# Patient Record
Sex: FEMALE | Race: OTHER | Employment: UNEMPLOYED | URBAN - METROPOLITAN AREA
[De-identification: names, ages, dates, MRNs, and addresses within clinical notes are randomized per-mention and may not be internally consistent; named-entity substitution may affect disease eponyms.]

---

## 2017-12-14 ENCOUNTER — OFFICE VISIT (OUTPATIENT)
Dept: FAMILY MEDICINE CLINIC | Age: 52
End: 2017-12-14

## 2017-12-14 VITALS
TEMPERATURE: 98.3 F | RESPIRATION RATE: 16 BRPM | BODY MASS INDEX: 28.66 KG/M2 | SYSTOLIC BLOOD PRESSURE: 155 MMHG | WEIGHT: 151.8 LBS | HEART RATE: 98 BPM | OXYGEN SATURATION: 98 % | DIASTOLIC BLOOD PRESSURE: 85 MMHG | HEIGHT: 61 IN

## 2017-12-14 DIAGNOSIS — J40 BRONCHITIS: Primary | ICD-10-CM

## 2017-12-14 RX ORDER — AZITHROMYCIN 250 MG/1
TABLET, FILM COATED ORAL
Qty: 6 TAB | Refills: 0 | Status: SHIPPED | OUTPATIENT
Start: 2017-12-14 | End: 2018-06-14 | Stop reason: ALTCHOICE

## 2017-12-14 NOTE — PROGRESS NOTES
Jaswinder Ferguson is a 47 y.o.  female and presents with a few days of productive cough. She [place]. No smoking or chronic URI or LRI. Chief Complaint   Patient presents with    Cough     Subjective: Additional Concerns: none    Patient Active Problem List    Diagnosis Date Noted    Family history of fatty liver 02/10/2016     Current Outpatient Prescriptions   Medication Sig Dispense Refill    azithromycin (ZITHROMAX) 250 mg tablet 2 today, then 1 daily till gone. 6 Tab 0    ibuprofen (MOTRIN) 800 mg tablet Take 1 Tab by mouth every eight (8) hours as needed for Pain. 30 Tab 0    multivitamin (ONE A DAY) tablet Take 1 Tab by mouth daily.  calcium 500 mg tab Take 1 Tab by mouth daily. Allergies   Allergen Reactions    Clindamycin Hives    Nickel Rash    Silver Rash     Past Medical History:   Diagnosis Date    Infertility, female      History reviewed. No pertinent surgical history.   Family History   Problem Relation Age of Onset   Harjit Ha Cancer Mother      liver    Liver Disease Sister     Liver Disease Sister      Social History   Substance Use Topics    Smoking status: Never Smoker    Smokeless tobacco: Never Used    Alcohol use Yes      Comment: socially     ROS     General: negative for - chills, fatigue, fever, weight change  ENT: negative for - headaches, hearing change, nasal congestion, oral lesions, sneezing or sore throat  Resp: positive for - cough, no shortness of breath or wheezing  CV: negative for - chest pain, edema or palpitations    Objective:  Vitals:    12/14/17 1611   BP: 155/85   Pulse: 98   Resp: 16   Temp: 98.3 °F (36.8 °C)   TempSrc: Oral   SpO2: 98%   Weight: 151 lb 12.8 oz (68.9 kg)   Height: 5' 1\" (1.549 m)   PainSc:   9     PE    alert, well appearing, and in no distress, oriented to person, place, and time and overweight  Mental status - alert, oriented to person, place, and time, normal mood, behavior, speech, dress, motor activity, and thought processes  Chest - clear to auscultation, no wheezes, rales or rhonchi, symmetric air entry  Heart - normal rate, regular rhythm, normal S1, S2, no murmurs, rubs, clicks or gallops    LABS   No visits with results within 6 Month(s) from this visit. Latest known visit with results is:    Office Visit on 07/12/2016   Component Date Value Ref Range Status    VALID INTERNAL CONTROL POC 07/12/2016 Yes   Final    HCG urine, Ql. (POC) 07/12/2016 Negative  Negative Final       TESTS  Results for orders placed or performed in visit on 07/12/16   AMB POC URINE PREGNANCY TEST, VISUAL COLOR COMPARISON   Result Value Ref Range    VALID INTERNAL CONTROL POC Yes     HCG urine, Ql. (POC) Negative Negative     Assessment/Plan:       Bronchitis - OTC cough and mucinex for thick mucus.   - azithromycin (ZITHROMAX) 250 mg tablet; 2 today, then 1 daily till gone. Dispense: 6 Tab; Refill: 0    Lab review: no lab studies available for review at time of visit. I have discussed the diagnosis with the patient and the intended plan as seen in the above orders. The patient has received an after-visit summary and questions were answered concerning future plans. I have discussed medication side effects and warnings with the patient as well. I have reviewed the plan of care with the patient, accepted their input and they are in agreement with the treatment goals. F/U as needed.      Benito Paz MD

## 2017-12-14 NOTE — PROGRESS NOTES
Rebeca Montano is a 47 y.o. female presents to office for cough for almost a week        1.  Have you been to the ER, urgent care clinic or hospitalized since your last visit? no          Health Maintenance items with a due date reviewed with patient:  Health Maintenance Due   Topic Date Due    Hepatitis C Screening  1965    FOBT Q 1 YEAR AGE 50-75  10/06/2014    Influenza Age 9 to Adult  08/01/2017    PAP AKA CERVICAL CYTOLOGY  09/03/2017

## 2018-06-14 ENCOUNTER — HOSPITAL ENCOUNTER (OUTPATIENT)
Dept: LAB | Age: 53
Discharge: HOME | End: 2018-06-14
Payer: COMMERCIAL

## 2018-06-14 ENCOUNTER — OFFICE VISIT (OUTPATIENT)
Dept: FAMILY MEDICINE CLINIC | Age: 53
End: 2018-06-14

## 2018-06-14 VITALS
SYSTOLIC BLOOD PRESSURE: 124 MMHG | OXYGEN SATURATION: 97 % | BODY MASS INDEX: 24.92 KG/M2 | DIASTOLIC BLOOD PRESSURE: 82 MMHG | RESPIRATION RATE: 17 BRPM | TEMPERATURE: 97.2 F | WEIGHT: 132 LBS | HEIGHT: 61 IN | HEART RATE: 96 BPM

## 2018-06-14 DIAGNOSIS — K85.80 OTHER ACUTE PANCREATITIS, UNSPECIFIED COMPLICATION STATUS: Primary | ICD-10-CM

## 2018-06-14 DIAGNOSIS — K85.80 OTHER ACUTE PANCREATITIS, UNSPECIFIED COMPLICATION STATUS: ICD-10-CM

## 2018-06-14 DIAGNOSIS — G43.109 MIGRAINE WITH AURA AND WITHOUT STATUS MIGRAINOSUS, NOT INTRACTABLE: ICD-10-CM

## 2018-06-14 LAB
ALBUMIN SERPL-MCNC: 3.8 G/DL (ref 3.4–5)
ALBUMIN/GLOB SERPL: 1.1 {RATIO} (ref 0.8–1.7)
ALP SERPL-CCNC: 219 U/L (ref 45–117)
ALT SERPL-CCNC: 224 U/L (ref 13–56)
ANION GAP SERPL CALC-SCNC: 10 MMOL/L (ref 3–18)
AST SERPL-CCNC: 32 U/L (ref 15–37)
BILIRUB SERPL-MCNC: 0.5 MG/DL (ref 0.2–1)
BUN SERPL-MCNC: 5 MG/DL (ref 7–18)
BUN/CREAT SERPL: 9 (ref 12–20)
CALCIUM SERPL-MCNC: 9.5 MG/DL (ref 8.5–10.1)
CHLORIDE SERPL-SCNC: 103 MMOL/L (ref 100–108)
CHOLEST SERPL-MCNC: 175 MG/DL
CO2 SERPL-SCNC: 28 MMOL/L (ref 21–32)
CREAT SERPL-MCNC: 0.55 MG/DL (ref 0.6–1.3)
GLOBULIN SER CALC-MCNC: 3.4 G/DL (ref 2–4)
GLUCOSE SERPL-MCNC: 69 MG/DL (ref 74–99)
HDLC SERPL-MCNC: 60 MG/DL (ref 40–60)
HDLC SERPL: 2.9 {RATIO} (ref 0–5)
LDLC SERPL CALC-MCNC: 95.2 MG/DL (ref 0–100)
LIPASE SERPL-CCNC: 283 U/L (ref 73–393)
LIPID PROFILE,FLP: NORMAL
POTASSIUM SERPL-SCNC: 3.7 MMOL/L (ref 3.5–5.5)
PROT SERPL-MCNC: 7.2 G/DL (ref 6.4–8.2)
SODIUM SERPL-SCNC: 141 MMOL/L (ref 136–145)
TRIGL SERPL-MCNC: 99 MG/DL (ref ?–150)
VLDLC SERPL CALC-MCNC: 19.8 MG/DL

## 2018-06-14 PROCEDURE — 36415 COLL VENOUS BLD VENIPUNCTURE: CPT | Performed by: FAMILY MEDICINE

## 2018-06-14 PROCEDURE — 80053 COMPREHEN METABOLIC PANEL: CPT | Performed by: FAMILY MEDICINE

## 2018-06-14 PROCEDURE — 80061 LIPID PANEL: CPT | Performed by: FAMILY MEDICINE

## 2018-06-14 PROCEDURE — 83690 ASSAY OF LIPASE: CPT | Performed by: FAMILY MEDICINE

## 2018-06-14 RX ORDER — BUTALBITAL, ACETAMINOPHEN AND CAFFEINE 50; 325; 40 MG/1; MG/1; MG/1
1 TABLET ORAL
Qty: 45 TAB | Refills: 1 | Status: SHIPPED | OUTPATIENT
Start: 2018-06-14

## 2018-06-14 NOTE — PATIENT INSTRUCTIONS
Migraine Headache: Care Instructions  Your Care Instructions  Migraines are painful, throbbing headaches that often start on one side of the head. They may cause nausea and vomiting and make you sensitive to light, sound, or smell. Without treatment, migraines can last from 4 hours to a few days. Medicines can help prevent migraines or stop them after they have started. Your doctor can help you find which ones work best for you. Follow-up care is a key part of your treatment and safety. Be sure to make and go to all appointments, and call your doctor if you are having problems. It's also a good idea to know your test results and keep a list of the medicines you take. How can you care for yourself at home? · Do not drive if you have taken a prescription pain medicine. · Rest in a quiet, dark room until your headache is gone. Close your eyes, and try to relax or go to sleep. Don't watch TV or read. · Put a cold, moist cloth or cold pack on the painful area for 10 to 20 minutes at a time. Put a thin cloth between the cold pack and your skin. · Use a warm, moist towel or a heating pad set on low to relax tight shoulder and neck muscles. · Have someone gently massage your neck and shoulders. · Take your medicines exactly as prescribed. Call your doctor if you think you are having a problem with your medicine. You will get more details on the specific medicines your doctor prescribes. · Be careful not to take pain medicine more often than the instructions allow. You could get worse or more frequent headaches when the medicine wears off. To prevent migraines  · Keep a headache diary so you can figure out what triggers your headaches. Avoiding triggers may help you prevent headaches. Record when each headache began, how long it lasted, and what the pain was like.  (Was it throbbing, aching, stabbing, or dull?) Write down any other symptoms you had with the headache, such as nausea, flashing lights or dark spots, or sensitivity to bright light or loud noise. Note if the headache occurred near your period. List anything that might have triggered the headache. Triggers may include certain foods (chocolate, cheese, wine) or odors, smoke, bright light, stress, or lack of sleep. · If your doctor has prescribed medicine for your migraines, take it as directed. You may have medicine that you take only when you get a migraine and medicine that you take all the time to help prevent migraines. ¨ If your doctor has prescribed medicine for when you get a headache, take it at the first sign of a migraine, unless your doctor has given you other instructions. ¨ If your doctor has prescribed medicine to prevent migraines, take it exactly as prescribed. Call your doctor if you think you are having a problem with your medicine. · Find healthy ways to deal with stress. Migraines are most common during or right after stressful times. Take time to relax before and after you do something that has caused a migraine in the past.  · Try to keep your muscles relaxed by keeping good posture. Check your jaw, face, neck, and shoulder muscles for tension. Try to relax them. When you sit at a desk, change positions often. And make sure to stretch for 30 seconds each hour. · Get plenty of sleep and exercise. · Eat meals on a regular schedule. Avoid foods and drinks that often trigger migraines. These include chocolate, alcohol (especially red wine and port), aspartame, monosodium glutamate (MSG), and some additives found in foods (such as hot dogs, roach, cold cuts, aged cheeses, and pickled foods). · Limit caffeine. Don't drink too much coffee, tea, or soda. But don't quit caffeine suddenly. That can also give you migraines. · Do not smoke or allow others to smoke around you. If you need help quitting, talk to your doctor about stop-smoking programs and medicines. These can increase your chances of quitting for good.   · If you are taking birth control pills or hormone therapy, talk to your doctor about whether they are triggering your migraines. When should you call for help? Call 911 anytime you think you may need emergency care. For example, call if:  ? · You have signs of a stroke. These may include:  ¨ Sudden numbness, paralysis, or weakness in your face, arm, or leg, especially on only one side of your body. ¨ Sudden vision changes. ¨ Sudden trouble speaking. ¨ Sudden confusion or trouble understanding simple statements. ¨ Sudden problems with walking or balance. ¨ A sudden, severe headache that is different from past headaches. ?Call your doctor now or seek immediate medical care if:  ? · You have new or worse nausea and vomiting. ? · You have a new or higher fever. ? · Your headache gets much worse. ? Watch closely for changes in your health, and be sure to contact your doctor if:  ? · You are not getting better after 2 days (48 hours). Where can you learn more? Go to http://shaneka-rk.info/. Enter K110 in the search box to learn more about \"Migraine Headache: Care Instructions. \"  Current as of: October 14, 2016  Content Version: 11.4  © 0321-7527 [employer]. Care instructions adapted under license by [employer] (which disclaims liability or warranty for this information). If you have questions about a medical condition or this instruction, always ask your healthcare professional. [place] [address] any warranty or liability for your use of this information. Pancreatitis: Care Instructions  Your Care Instructions    The pancreas is an organ behind the stomach. It makes hormones and enzymes to help your body digest food. But if these enzymes attack the pancreas, it can get inflamed. This is called pancreatitis. Most cases are caused by gallstones or by heavy alcohol use. If you take care of yourself at home, it will help you get better.  It will also help you avoid more problems with your pancreas. Follow-up care is a key part of your treatment and safety. Be sure to make and go to all appointments, and call your doctor if you are having problems. It's also a good idea to know your test results and keep a list of the medicines you take. How can you care for yourself at home? · Drink clear liquids and eat bland foods until you feel better. [place] foods include rice, dry toast, and crackers. They also include bananas and applesauce. · Eat a low-fat diet until your doctor says your pancreas is healed. · Do not drink alcohol. Tell your doctor if you need help to quit. Counseling, support groups, and sometimes medicines can help you stay sober. · Be safe with medicines. Read and follow all instructions on the label. ¨ If the doctor gave you a prescription medicine for pain, take it as prescribed. ¨ If you are not taking a prescription pain medicine, ask your doctor if you can take an over-the-counter medicine. · If your doctor prescribed antibiotics, take them as directed. Do not stop taking them just because you feel better. You need to take the full course of antibiotics. · Get extra rest until you feel better. To prevent future problems with your pancreas  · Do not drink alcohol. · Tell your doctors and pharmacist that you've had pancreatitis. They can help you avoid medicines that may cause this problem again. When should you call for help? Call 911 anytime you think you may need emergency care. For example, call if:  ? · You vomit blood or what looks like coffee grounds. ? · Your stools are maroon or very bloody. ?Call your doctor now or seek immediate medical care if:  ? · You have new or worse belly pain. ? · Your stools are black and look like tar, or they have streaks of blood. ? · You are vomiting. ? Watch closely for changes in your health, and be sure to contact your doctor if:  ? · You do not get better as expected.    Where can you learn more?  Go to http://shaneka-rk.info/. Enter N264 in the search box to learn more about \"Pancreatitis: Care Instructions. \"  Current as of: May 12, 2017  Content Version: 11.4  © 9125-3732 [employer], [employer]. Care instructions adapted under license by [employer] (which disclaims liability or warranty for this information). If you have questions about a medical condition or this instruction, always ask your healthcare professional. [address] any warranty or liability for your use of this information.

## 2018-06-14 NOTE — PROGRESS NOTES
Nabeel Gabe is a 47 y.o. female presents to office for hospital follow up    Medication list has been reviewed with Nabeel Bernal and updated as of today's date     Health Maintenance items with a due date reviewed with patient:  Health Maintenance Due   Topic Date Due    Hepatitis C Screening  1965    BREAST CANCER SCRN MAMMOGRAM  10/06/2014    FOBT Q 1 YEAR AGE 50-75  10/06/2014    PAP AKA CERVICAL CYTOLOGY  09/03/2017

## 2018-06-16 NOTE — PROGRESS NOTES
Pls report to patient her lipase marker for pancreatitis back to normal and rest of labs normal to near normal. Patient still needs to F/U with referral regardless to make sure   We do not have anything else we need to do or monitor.

## 2018-06-16 NOTE — PROGRESS NOTES
Jairon Abrams is a 47 y.o. [address] female and presents with F/U for hosp adm for acute idiopathic pancreatitis. Patient   Denies chronic abuse of alcohol or illegal drug use. No current meds that may cause it. Patient received supportive treatment in hospital and discharged  To F/U with PCP. No specialist referral from hospital. Patient appears recovered now with near normal lipase and asymptomatic. Chief Complaint   Patient presents with   [facility] Follow Up     Subjective: Additional Concerns: none    Patient Active Problem List    Diagnosis Date Noted    Family history of fatty liver 02/10/2016     Current Outpatient Prescriptions   Medication Sig Dispense Refill    butalbital-acetaminophen-caffeine (FIORICET, ESGIC) -40 mg per tablet Take 1 Tab by mouth every six (6) hours as needed for Pain. Indications: Migraine 45 Tab 1    ibuprofen (MOTRIN) 800 mg tablet Take 1 Tab by mouth every eight (8) hours as needed for Pain. 30 Tab 0    multivitamin (ONE A DAY) tablet Take 1 Tab by mouth daily.  calcium 500 mg tab Take 1 Tab by mouth daily.        Allergies   Allergen Reactions    Clindamycin Hives    Nickel Rash    Silver Rash     Past Medical History:   Diagnosis Date    Infertility, female      Past Surgical History:   Procedure Laterality Date    HX CHOLECYSTECTOMY       Family History   Problem Relation Age of Onset    Cancer Mother      liver    Liver Disease Sister     Liver Disease Sister      Social History   Substance Use Topics    Smoking status: Never Smoker    Smokeless tobacco: Never Used    Alcohol use Yes      Comment: socially     ROS     General: negative for - chills, fatigue, fever, weight change  Resp: negative for - cough, shortness of breath or wheezing  CV: negative for - chest pain, edema or palpitations  GI: negative for - abdominal pain, change in bowel habits, constipation, diarrhea or nausea/vomiting  : negative for - dysuria, hematuria, incontinence, pelvic pain or vulvar/vaginal symptoms  Neuro: negative for - confusion, positive headaches, no seizures or weakness    Objective:  Vitals:    06/14/18 1026   BP: 124/82   Pulse: 96   Resp: 17   Temp: 97.2 °F (36.2 °C)   TempSrc: Oral   SpO2: 97%   Weight: 132 lb (59.9 kg)   Height: 5' 1\" (1.549 m)   PainSc:   0 - No pain     PE    Alert, well appearing, and in no distress, oriented to person, place, and time and normal appearing weight  General appearance - alert, well appearing, and in no distress, oriented to person, place, and time and normal appearing weight  Mental status - alert, oriented to person, place, and time, normal mood, behavior, speech, dress, motor activity, and thought processes  Chest - clear to auscultation, no wheezes, rales or rhonchi, symmetric air entry  Heart - normal rate, regular rhythm, normal S1, S2, no murmurs, rubs, clicks or gallops  Extremities - peripheral pulses normal, no pedal edema, no clubbing or cyanosis    [facility]   Component Date Value Ref Range Status    LIPID PROFILE 06/14/2018        Final    Cholesterol, total 06/14/2018 175  <200 MG/DL Final    Triglyceride 06/14/2018 99  <150 MG/DL Final    Comment: The drugs N-acetylcysteine (NAC) and  Metamiszole have been found to cause falsely  low results in this chemical assay. Please  be sure to submit blood samples obtained  BEFORE administration of either of these  drugs to assure correct results.       HDL Cholesterol 06/14/2018 60  40 - 60 MG/DL Final    LDL, calculated 06/14/2018 95.2  0 - 100 MG/DL Final    VLDL, calculated 06/14/2018 19.8  MG/DL Final    CHOL/HDL Ratio 06/14/2018 2.9  0 - 5.0   Final    Sodium 06/14/2018 141  136 - 145 mmol/L Final    Potassium 06/14/2018 3.7  3.5 - 5.5 mmol/L Final    Chloride 06/14/2018 103  100 - 108 mmol/L Final    CO2 06/14/2018 28  21 - 32 mmol/L Final    Anion gap 06/14/2018 10  3.0 - 18 mmol/L Final    Glucose 06/14/2018 69* 74 - 99 mg/dL Final    BUN 06/14/2018 5* 7.0 - 18 MG/DL Final    Creatinine 06/14/2018 0.55* 0.6 - 1.3 MG/DL Final    BUN/Creatinine ratio 06/14/2018 9* 12 - 20   Final    GFR est AA 06/14/2018 >60  >60 ml/min/1.73m2 Final    GFR est non-AA 06/14/2018 >60  >60 ml/min/1.73m2 Final    Comment: (NOTE)  Estimated GFR is calculated using the Modification of Diet in Renal   Disease (MDRD) Study equation, reported for both  Americans   (GFRAA) and non- Americans (GFRNA), and normalized to 1.73m2   body surface area. The physician must decide which value applies to   the patient. The MDRD study equation should only be used in   individuals age 25 or older. It has not been validated for the   following: pregnant women, patients with serious comorbid conditions,   or on certain medications, or persons with extremes of body size,   muscle mass, or nutritional status.  Calcium 06/14/2018 9.5  8.5 - 10.1 MG/DL Final    Bilirubin, total 06/14/2018 0.5  0.2 - 1.0 MG/DL Final    ALT (SGPT) 06/14/2018 224* 13 - 56 U/L Final    AST (SGOT) 06/14/2018 32  15 - 37 U/L Final    Alk. phosphatase 06/14/2018 219* 45 - 117 U/L Final    Protein, total 06/14/2018 7.2  6.4 - 8.2 g/dL Final    Albumin 06/14/2018 3.8  3.4 - 5.0 g/dL Final    Globulin 06/14/2018 3.4  2.0 - 4.0 g/dL Final    A-G Ratio 06/14/2018 1.1  0.8 - 1.7   Final    Lipase 06/14/2018 283  73 - 393 U/L Final       TESTS  Results for orders placed or performed in visit on 07/12/16   AMB POC URINE PREGNANCY TEST, VISUAL COLOR COMPARISON   Result Value Ref Range    VALID INTERNAL CONTROL POC Yes     HCG urine, Ql. (POC) Negative Negative     Assessment/Plan:      1. Other acute pancreatitis, unspecified complication status  - REFERRAL TO GASTROENTEROLOGY  - LIPID PANEL; Future  - METABOLIC PANEL, COMPREHENSIVE; Future  - LIPASE; Future    2.  Migraine with aura and without status migrainosus, not intractable  - butalbital-acetaminophen-caffeine (FIORICET, ESGIC) -40 mg per tablet; Take 1 Tab by mouth every six (6) hours as needed for Pain. Indications: Migraine  Dispense: 45 Tab; Refill: 1    Lab review: orders written for new lab studies as appropriate; see orders. We will call patient for results and further plan. I have discussed the diagnosis with the patient and the intended plan as seen in the above orders. The patient has received an after-visit summary and questions were answered concerning future plans. I have discussed medication side effects and warnings with the patient as well. I have reviewed the plan of care with the patient, accepted their input and they are in agreement with the treatment goals. F/U as needed.      Neil Huynh MD

## 2018-06-18 ENCOUNTER — TELEPHONE (OUTPATIENT)
Dept: FAMILY MEDICINE CLINIC | Age: 53
End: 2018-06-18

## 2018-06-18 NOTE — TELEPHONE ENCOUNTER
Pt called to f/u on lab results,   Final results and interpretation in pt's chart. Read result note to pt, she was understanding and had not f/u questions.      Closing encounter

## 2018-07-13 ENCOUNTER — DOCUMENTATION ONLY (OUTPATIENT)
Dept: FAMILY MEDICINE CLINIC | Age: 53
End: 2018-07-13

## 2018-07-13 NOTE — PROGRESS NOTES
Pt called this morning extremely upset and yelling at me about a bill she received from   Gastroenterology [address] [place] [address]  [address], [address] [facility]  Main Phone: [phone]  Fax: [phone]    Pt repeatedly yelled at me that we scheduled an appointment for her without her knowledge and she received a bill for $50.00 due to no-showing the appointment. I tried to communicate multiple times to the pt that we did not schedule and appointment for her, that we sent her referral out but that we do not schedule appointments for pt's at specialty offices. She continually spoke over me every time I would try to speak and would repeat that we scheduled an appointment for her and needed to call and fix the bill. I advised the pt to call [facility] multiple time to discuss the charges with them as we were not able to dispute those charges for her. The pt continued to repeat herself and yell over me throughout the call and yelled at me that I was no listening to her. I stated to the pt that I was listening to her but that I felt we had come to stalemate in the conversation as there was nothing else I could help her with at this time. I advised the pt again to reach out to the Gastroenterology office and that if she was unable to resolve the issue she could call and speak with a manager next week. The pt stated again that we scheduled the appointment and we needed to fix the issue and hung up with me. I called [facility] and spoke with their office, the pt was scheduled for an appointment on 6/12 by a nurse in the hospital prior to her discharge, the appointment was set for 7/10/18. Gastroenterology stated they would waive the fee for the pt since it was not scheduled by the pt. Called pt to advise, SUKHWINDER with information for pt to call [facility] in regards to them waiving $50.00 fee.

## 2019-01-16 ENCOUNTER — OFFICE VISIT (OUTPATIENT)
Dept: FAMILY MEDICINE CLINIC | Age: 54
End: 2019-01-16

## 2019-01-16 ENCOUNTER — HOSPITAL ENCOUNTER (OUTPATIENT)
Dept: LAB | Age: 54
Discharge: HOME | End: 2019-01-16
Payer: COMMERCIAL

## 2019-01-16 VITALS
TEMPERATURE: 97.8 F | OXYGEN SATURATION: 99 % | WEIGHT: 130.8 LBS | SYSTOLIC BLOOD PRESSURE: 107 MMHG | BODY MASS INDEX: 24.7 KG/M2 | DIASTOLIC BLOOD PRESSURE: 71 MMHG | HEART RATE: 82 BPM | RESPIRATION RATE: 16 BRPM | HEIGHT: 61 IN

## 2019-01-16 DIAGNOSIS — R10.9 ABDOMINAL PAIN, UNSPECIFIED ABDOMINAL LOCATION: ICD-10-CM

## 2019-01-16 DIAGNOSIS — Z87.19 H/O ACUTE PANCREATITIS: ICD-10-CM

## 2019-01-16 DIAGNOSIS — R11.0 NAUSEA: ICD-10-CM

## 2019-01-16 DIAGNOSIS — J06.9 URTI (ACUTE UPPER RESPIRATORY INFECTION): ICD-10-CM

## 2019-01-16 DIAGNOSIS — R11.0 NAUSEA: Primary | ICD-10-CM

## 2019-01-16 LAB
ALBUMIN SERPL-MCNC: 4 G/DL (ref 3.4–5)
ALBUMIN/GLOB SERPL: 1.1 {RATIO} (ref 0.8–1.7)
ALP SERPL-CCNC: 64 U/L (ref 45–117)
ALT SERPL-CCNC: 25 U/L (ref 13–56)
ANION GAP SERPL CALC-SCNC: 9 MMOL/L (ref 3–18)
AST SERPL-CCNC: 17 U/L (ref 15–37)
BASOPHILS # BLD: 0 K/UL (ref 0–0.1)
BASOPHILS NFR BLD: 0 % (ref 0–2)
BILIRUB SERPL-MCNC: 0.3 MG/DL (ref 0.2–1)
BUN SERPL-MCNC: 15 MG/DL (ref 7–18)
BUN/CREAT SERPL: 21 (ref 12–20)
CALCIUM SERPL-MCNC: 9.5 MG/DL (ref 8.5–10.1)
CHLORIDE SERPL-SCNC: 103 MMOL/L (ref 100–108)
CO2 SERPL-SCNC: 26 MMOL/L (ref 21–32)
CREAT SERPL-MCNC: 0.73 MG/DL (ref 0.6–1.3)
DIFFERENTIAL METHOD BLD: NORMAL
EOSINOPHIL # BLD: 0.2 K/UL (ref 0–0.4)
EOSINOPHIL NFR BLD: 2 % (ref 0–5)
ERYTHROCYTE [DISTWIDTH] IN BLOOD BY AUTOMATED COUNT: 12.7 % (ref 11.6–14.5)
GLOBULIN SER CALC-MCNC: 3.5 G/DL (ref 2–4)
GLUCOSE SERPL-MCNC: 90 MG/DL (ref 74–99)
HCT VFR BLD AUTO: 41.3 % (ref 35–45)
HGB BLD-MCNC: 13.9 G/DL (ref 12–16)
LYMPHOCYTES # BLD: 3.1 K/UL (ref 0.9–3.6)
LYMPHOCYTES NFR BLD: 41 % (ref 21–52)
MCH RBC QN AUTO: 30.3 PG (ref 24–34)
MCHC RBC AUTO-ENTMCNC: 33.7 G/DL (ref 31–37)
MCV RBC AUTO: 90.2 FL (ref 74–97)
MONOCYTES # BLD: 0.4 K/UL (ref 0.05–1.2)
MONOCYTES NFR BLD: 5 % (ref 3–10)
NEUTS SEG # BLD: 3.9 K/UL (ref 1.8–8)
NEUTS SEG NFR BLD: 52 % (ref 40–73)
PLATELET # BLD AUTO: 311 K/UL (ref 135–420)
PMV BLD AUTO: 9.5 FL (ref 9.2–11.8)
POTASSIUM SERPL-SCNC: 4 MMOL/L (ref 3.5–5.5)
PROT SERPL-MCNC: 7.5 G/DL (ref 6.4–8.2)
RBC # BLD AUTO: 4.58 M/UL (ref 4.2–5.3)
SODIUM SERPL-SCNC: 138 MMOL/L (ref 136–145)
WBC # BLD AUTO: 7.5 K/UL (ref 4.6–13.2)

## 2019-01-16 PROCEDURE — 36415 COLL VENOUS BLD VENIPUNCTURE: CPT

## 2019-01-16 PROCEDURE — 80053 COMPREHEN METABOLIC PANEL: CPT

## 2019-01-16 PROCEDURE — 85025 COMPLETE CBC W/AUTO DIFF WBC: CPT

## 2019-01-16 RX ORDER — ONDANSETRON 8 MG/1
8 TABLET, ORALLY DISINTEGRATING ORAL
Qty: 30 TAB | Refills: 1 | Status: SHIPPED | OUTPATIENT
Start: 2019-01-16

## 2019-01-16 NOTE — PROGRESS NOTES
Identified pt with two pt identifiers(name and ). Reviewed record in preparation for visit and have obtained necessary documentation. Chief Complaint   Patient presents with    Pancreatitis     pt c/o flare-up        Coordination of Care Questionnaire:  :   1) Have you been to an emergency room, urgent care, or hospitalized since your last visit?   no   If yes, where when, and reason for visit? 2. Have seen or consulted any other health care provider since your last visit? NO  If yes, where when, and reason for visit? Patient is accompanied by self I have received verbal consent from Ziyad Kwok to discuss any/all medical information while they are present in the room.

## 2019-01-16 NOTE — PROGRESS NOTES
[facility]     Chief Complaint   Patient presents with    Pancreatitis     pt c/o flare-up     Vitals:    01/16/19 1534   BP: 107/71   Pulse: 82   Resp: 16   Temp: 97.8 °F (36.6 °C)   TempSrc: Oral   SpO2: 99%   Weight: 130 lb 12.8 oz (59.3 kg)   Height: 5' 1\" (1.549 m)   PainSc:   0 - No pain         HPI: Patient is here because of episode of nausea for the last few weeks and happens in the morning, is not preventing the patient from eating appetite is normal.  Patient is not vomiting. She did have a history of pancreatitis following cholecystectomy a few months ago and she had recovered well from that. Patient was in [place] for 10 days when she arrived here about 2 weeks ago, episode of loss of consciousness for 15 seconds was witnessed by her son, prior to loss of consciousness patient was severe nausea and possible dry heaves, have any vomiting no fever no headache no blurred vision no seizures, she stated that her son told her that she lost consciousness for patient second and then she regained that and she was normal no drowsiness no dizziness. Patient never had an episode like this and she never had any other episode after that, I have explained to the patient if he experiences any dizziness palpitations loss of consciousness or near syncope she had to go to emergency room immediately. For the last few days patient has been having cough sore throat nasal congestion no fever no chills no shortness of breath symptom has improved but she still having a slight dry cough.     Past Medical History:   Diagnosis Date    Infertility, female      Past Surgical History:   Procedure Laterality Date    HX CHOLECYSTECTOMY       Social History     Tobacco Use    Smoking status: Never Smoker    Smokeless tobacco: Never Used   Substance Use Topics    Alcohol use: Yes     Comment: socially       Family History   Problem Relation Age of Onset    Cancer Mother         liver    Liver Disease Sister     Liver Disease Sister        Review of Systems   Constitutional: Negative for chills, fever, malaise/fatigue and weight loss. HENT: Negative for congestion, ear discharge, ear pain, hearing loss, nosebleeds and sinus pain. Eyes: Negative for blurred vision, double vision and discharge. Respiratory: Negative for cough, sputum production and wheezing. Cardiovascular: Negative for chest pain, palpitations, claudication and leg swelling. Gastrointestinal: Positive for abdominal pain and vomiting. Negative for blood in stool, constipation, diarrhea, heartburn, melena and nausea. Genitourinary: Negative for dysuria, frequency, hematuria and urgency. Musculoskeletal: Negative for myalgias. Skin: Negative for itching and rash. Neurological: Positive for loss of consciousness. Negative for dizziness, tingling, tremors, sensory change, speech change, focal weakness, weakness and headaches. Psychiatric/Behavioral: Negative for depression and suicidal ideas. Physical Exam   Constitutional: She is oriented to person, place, and time. She appears well-developed and well-nourished. No distress. HENT:   Head: Normocephalic and atraumatic. Mouth/Throat: Oropharynx is clear and moist. No oropharyngeal exudate. Eyes: Conjunctivae are normal. Pupils are equal, round, and reactive to light. Right eye exhibits no discharge. Left eye exhibits no discharge. No scleral icterus. Neck: No thyromegaly present. Cardiovascular: Normal rate, regular rhythm and normal heart sounds. No murmur heard. Pulmonary/Chest: Effort normal and breath sounds normal. No respiratory distress. She has no wheezes. She has no rales. She exhibits no tenderness. Abdominal: Soft. She exhibits no distension. There is no tenderness. There is no rebound. Musculoskeletal: Normal range of motion. She exhibits no edema, tenderness or deformity. Lymphadenopathy:     She has no cervical adenopathy.    Neurological: She is alert and oriented to person, place, and time. No cranial nerve deficit. Coordination normal.   Skin: Skin is warm and dry. No rash noted. She is not diaphoretic. No erythema. No pallor. Psychiatric: She has a normal mood and affect. Her behavior is normal. Judgment and thought content normal.   Nursing note and vitals reviewed. Assessment and plan     Plan of care has been discussed with the patient, he agrees to the plan and verbalized understanding. All his questions were answered  More than 50% of the time spent in this visit was counseling the patient about  illness and treatment options         1. H/O acute pancreatitis    - CBC WITH AUTOMATED DIFF; Future  - METABOLIC PANEL, COMPREHENSIVE; Future    2. Nausea    - CBC WITH AUTOMATED DIFF; Future  - METABOLIC PANEL, COMPREHENSIVE; Future  - ondansetron (ZOFRAN ODT) 8 mg disintegrating tablet; Take 1 Tab by mouth every eight (8) hours as needed for Nausea. Dispense: 30 Tab; Refill: 1    3. Abdominal pain, unspecified abdominal location    - CBC WITH AUTOMATED DIFF; Future  - METABOLIC PANEL, COMPREHENSIVE; Future    4. URTI (acute upper respiratory infection)  Resolving her symptoms are improving only supportive care, salt and water use lozenges increase hydration    Current Outpatient Medications   Medication Sig Dispense Refill    ondansetron (ZOFRAN ODT) 8 mg disintegrating tablet Take 1 Tab by mouth every eight (8) hours as needed for Nausea. 30 Tab 1    multivitamin (ONE A DAY) tablet Take 1 Tab by mouth daily.  calcium 500 mg tab Take 1 Tab by mouth daily.  butalbital-acetaminophen-caffeine (FIORICET, ESGIC) -40 mg per tablet Take 1 Tab by mouth every six (6) hours as needed for Pain. Indications: Migraine (Patient not taking: Reported on 1/16/2019) 45 Tab 1    ibuprofen (MOTRIN) 800 mg tablet Take 1 Tab by mouth every eight (8) hours as needed for Pain.  30 Tab 0       Patient Active Problem List    Diagnosis Date Noted    Family history of fatty liver 02/10/2016     Results for orders placed or performed during the hospital encounter of 06/14/18   LIPID PANEL   Result Value Ref Range    LIPID PROFILE          Cholesterol, total 175 <200 MG/DL    Triglyceride 99 <150 MG/DL    HDL Cholesterol 60 40 - 60 MG/DL    LDL, calculated 95.2 0 - 100 MG/DL    VLDL, calculated 19.8 MG/DL    CHOL/HDL Ratio 2.9 0 - 5.0     METABOLIC PANEL, COMPREHENSIVE   Result Value Ref Range    Sodium 141 136 - 145 mmol/L    Potassium 3.7 3.5 - 5.5 mmol/L    Chloride 103 100 - 108 mmol/L    CO2 28 21 - 32 mmol/L    Anion gap 10 3.0 - 18 mmol/L    Glucose 69 (L) 74 - 99 mg/dL    BUN 5 (L) 7.0 - 18 MG/DL    Creatinine 0.55 (L) 0.6 - 1.3 MG/DL    BUN/Creatinine ratio 9 (L) 12 - 20      GFR est AA >60 >60 ml/min/1.73m2    GFR est non-AA >60 >60 ml/min/1.73m2    Calcium 9.5 8.5 - 10.1 MG/DL    Bilirubin, total 0.5 0.2 - 1.0 MG/DL    ALT (SGPT) 224 (H) 13 - 56 U/L    AST (SGOT) 32 15 - 37 U/L    Alk. phosphatase 219 (H) 45 - 117 U/L    Protein, total 7.2 6.4 - 8.2 g/dL    Albumin 3.8 3.4 - 5.0 g/dL    Globulin 3.4 2.0 - 4.0 g/dL    A-G Ratio 1.1 0.8 - 1.7     LIPASE   Result Value Ref Range    Lipase 283 73 - 393 U/L     No visits with results within 3 Month(s) from this visit. Latest known visit with results is:   Hospital Outpatient Visit on 06/14/2018   Component Date Value Ref Range Status    LIPID PROFILE 06/14/2018        Final    Cholesterol, total 06/14/2018 175  <200 MG/DL Final    Triglyceride 06/14/2018 99  <150 MG/DL Final    Comment: The drugs N-acetylcysteine (NAC) and  Metamiszole have been found to cause falsely  low results in this chemical assay. Please  be sure to submit blood samples obtained  BEFORE administration of either of these  drugs to assure correct results.       HDL Cholesterol 06/14/2018 60  40 - 60 MG/DL Final    LDL, calculated 06/14/2018 95.2  0 - 100 MG/DL Final    VLDL, calculated 06/14/2018 19.8  MG/DL Final    CHOL/HDL Ratio 06/14/2018 2.9  0 - 5.0   Final    Sodium 06/14/2018 141  136 - 145 mmol/L Final    Potassium 06/14/2018 3.7  3.5 - 5.5 mmol/L Final    Chloride 06/14/2018 103  100 - 108 mmol/L Final    CO2 06/14/2018 28  21 - 32 mmol/L Final    Anion gap 06/14/2018 10  3.0 - 18 mmol/L Final    Glucose 06/14/2018 69* 74 - 99 mg/dL Final    BUN 06/14/2018 5* 7.0 - 18 MG/DL Final    Creatinine 06/14/2018 0.55* 0.6 - 1.3 MG/DL Final    BUN/Creatinine ratio 06/14/2018 9* 12 - 20   Final    GFR est AA 06/14/2018 >60  >60 ml/min/1.73m2 Final    GFR est non-AA 06/14/2018 >60  >60 ml/min/1.73m2 Final    Comment: (NOTE)  Estimated GFR is calculated using the Modification of Diet in Renal   Disease (MDRD) Study equation, reported for both  Americans   (GFRAA) and non- Americans (GFRNA), and normalized to 1.73m2   body surface area. The physician must decide which value applies to   the patient. The MDRD study equation should only be used in   individuals age 25 or older. It has not been validated for the   following: pregnant women, patients with serious comorbid conditions,   or on certain medications, or persons with extremes of body size,   muscle mass, or nutritional status.  Calcium 06/14/2018 9.5  8.5 - 10.1 MG/DL Final    Bilirubin, total 06/14/2018 0.5  0.2 - 1.0 MG/DL Final    ALT (SGPT) 06/14/2018 224* 13 - 56 U/L Final    AST (SGOT) 06/14/2018 32  15 - 37 U/L Final    Alk. phosphatase 06/14/2018 219* 45 - 117 U/L Final    Protein, total 06/14/2018 7.2  6.4 - 8.2 g/dL Final    Albumin 06/14/2018 3.8  3.4 - 5.0 g/dL Final    Globulin 06/14/2018 3.4  2.0 - 4.0 g/dL Final    A-G Ratio 06/14/2018 1.1  0.8 - 1.7   Final    Lipase 06/14/2018 283  73 - 393 U/L Final          Follow-up Disposition:  Return if symptoms worsen or fail to improve, for as per results.

## 2019-02-01 ENCOUNTER — TELEPHONE (OUTPATIENT)
Dept: FAMILY MEDICINE CLINIC | Age: 54
End: 2019-02-01

## 2019-02-01 NOTE — TELEPHONE ENCOUNTER
----- Message from Nader Goldsmith MD sent at 1/17/2019  8:05 AM EST -----  Normal CBC and normal metabolic panel and liver function tests and kidney function test

## 2019-02-01 NOTE — TELEPHONE ENCOUNTER
1/31/19 5:07pm  Pt request a call back from the practice in regards to her lab results taken on 01/16/19. Best contact number is [phone].

## 2019-02-01 NOTE — TELEPHONE ENCOUNTER
Called, verified 2 patient identifiers, and provided lab results and recommendations to patient. Patient verbalized understanding and had no further questions or concerns.